# Patient Record
Sex: FEMALE | Race: WHITE | NOT HISPANIC OR LATINO | Employment: UNEMPLOYED | URBAN - METROPOLITAN AREA
[De-identification: names, ages, dates, MRNs, and addresses within clinical notes are randomized per-mention and may not be internally consistent; named-entity substitution may affect disease eponyms.]

---

## 2022-04-28 DIAGNOSIS — R56.9 SEIZURES: Primary | ICD-10-CM

## 2022-08-23 PROBLEM — G40.909 SEIZURE DISORDER: Status: ACTIVE | Noted: 2017-06-05

## 2022-08-23 PROBLEM — E03.9 HYPOTHYROIDISM: Status: ACTIVE | Noted: 2017-08-14

## 2022-08-23 PROBLEM — H91.90 HEARING LOSS: Status: ACTIVE | Noted: 2017-06-05

## 2022-08-23 NOTE — PROGRESS NOTES
[facility] Initial Office Visit Note    Initial Visit Date: 08/24/2022  Current Visit Date:  08/24/2022    Chief Complaint:     Chief Complaint   Patient presents with    Seizures     States been 3-4 years ago       History of Present Illness:      This is 37 y.o. right hand dominant female who is referred for seizure disorder.    Age of Onset : 20 years old     Semiology: GTC for 3 minutes with post ictal confusion for 30 minutes     Frequency: Last event 2019    Provocation Factors: stress     Risk Factors  - Family history of seizure disorders:  No  - History of focal CNS lesions: No  - History of CNS infections: No  - History head trauma with prolonged LOC: No  - History of childhood seizures, including febrile seizures: No  - History of development delay: No   - History of underlying mood disorder: No   - History of sleep disorder: No  - Recreational drug use: Yes marijuana  - Alcohol use: No  - Family planning and contraceptive use: Tubal Ligation     Medications:     Current AEDs:   mg daily - gum hyperplasia.     Prior AEDs:  Cannot recall     Surgical Intervention & Devices:     - VNS:  - DBS  - RNS:  - Lobectomy:    Labs:     No results found for this or any previous visit.    Studies:      - routine EEG:    - one hour EEG:   - 24 hour EEG:  - Ambulatory EEG:  - EMU Video EEG:  - MRI Brain:   - NCHCT:  - WADA:    Review of Systems:     Review of Systems   All other systems reviewed and are negative.      Physical Exams:     Vitals:    08/24/22 1119   BP: 123/84   Pulse: 72   Resp: 20   Temp: 98.4 °F (36.9 °C)       Physical Exam  Vitals and nursing note reviewed.   Constitutional:       Appearance: Normal appearance.   HENT:      Head: Normocephalic and atraumatic.      Nose: Nose normal.      Mouth/Throat:      Mouth: Mucous membranes are moist.      Pharynx: Oropharynx is clear.   Eyes:      Conjunctiva/sclera: Conjunctivae normal.   Cardiovascular:      Rate and Rhythm: Normal rate and  regular rhythm.      Pulses: Normal pulses.   Pulmonary:      Effort: Pulmonary effort is normal.      Breath sounds: Normal breath sounds.   Abdominal:      General: Abdomen is flat.   Musculoskeletal:         General: Normal range of motion.      Cervical back: Normal range of motion.   Skin:     General: Skin is warm.   Neurological:      Mental Status: She is alert.       Comprehensive Neurological Exam:  Mental Status: Alert Oriented to Self, Date, and Place. Comprehension wnl. No dysarthria.   CN II - XII: GALILEO, No APD, VA grossly intact to finger counting at 6 ft, VFFC, No ptosis OU, EOMI without nystagmus, LT/Temp symmetric in CN V1-3 distribution, Hearing grossly intact, Face Symmetric, Tongue and Uvula midline, Trapezius symmetric bilateral.   Motor: tone and bulk wnl throughout, no abnormal involuntary or voluntary movements, 5/5 to confrontation, Fine finger movements wnl b/l, No pronator drift.   Sensory: LT, Proprioception, Vibration, PP, Temp symmetric. No sensory simultagnosia.   Reflexes: 2+ throughout, plantar reflexes downward bilateral.   Cerebellar: FNF wnl b/l, RAHM wnl b/l,  Romberg: Negative  Gait: normal.     Assessment:     This is 37 y.o. right hand dominant female with history of idiopathic generalized tonic clonic epilepsy.  Seizure-free for around 4 years.  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    Problem List Items Addressed This Visit        Neuro    Generalized idiopathic epilepsy, not intractable, without status epilepticus - Primary (Chronic)    Relevant Orders    EEG      Other Visit Diagnoses     Seizures        Gum hyperplasia              Plan:     [] Continue with  mg daily for now.  Will determine best medical management based on Electroencephalogram findings.  [] routine EEG    RTC 3 months telemedicine     Seizure education provided: including family planning and teratogenicity of AEDs, risk of uncontrolled seizure disorder, SUDEP. No driving until seizure free  for six months ([place] state law). No swimming, climbing heights, or operate heavy machinery without supervision. Patient is advised to avoid Benadryl, Tramadol, Wellbutrin, flashing lights, alcohol, sleep deprivation, and certain anti-biotics that can lower seizure threshold.     I have explained the treatment plan, diagnosis, and prognosis to patient. All questions are answered to the best of my knowledge.     Face to face time 45 minutes, including documentation, chart review, counseling, education, review of test results, relevant medical records, and coordination of care.   I have explained the treatment plan, diagnosis, and prognosis to patient. All questions are answered to the best of my knowledge.         Pratibha Bernal MD   General Neurology  08/24/2022

## 2022-08-24 ENCOUNTER — OFFICE VISIT (OUTPATIENT)
Dept: NEUROLOGY | Facility: CLINIC | Age: 37
End: 2022-08-24
Payer: MEDICAID

## 2022-08-24 VITALS
BODY MASS INDEX: 44.11 KG/M2 | OXYGEN SATURATION: 97 % | DIASTOLIC BLOOD PRESSURE: 84 MMHG | HEIGHT: 65 IN | RESPIRATION RATE: 20 BRPM | TEMPERATURE: 98 F | WEIGHT: 264.75 LBS | SYSTOLIC BLOOD PRESSURE: 123 MMHG | HEART RATE: 72 BPM

## 2022-08-24 DIAGNOSIS — K06.1 GUM HYPERPLASIA: ICD-10-CM

## 2022-08-24 DIAGNOSIS — G40.309 GENERALIZED IDIOPATHIC EPILEPSY, NOT INTRACTABLE, WITHOUT STATUS EPILEPTICUS: Primary | ICD-10-CM

## 2022-08-24 DIAGNOSIS — R56.9 SEIZURES: ICD-10-CM

## 2022-08-24 PROBLEM — G40.909 SEIZURE DISORDER: Chronic | Status: ACTIVE | Noted: 2017-06-05

## 2022-08-24 PROCEDURE — 3079F DIAST BP 80-89 MM HG: CPT | Mod: CPTII,,, | Performed by: PSYCHIATRY & NEUROLOGY

## 2022-08-24 PROCEDURE — 3074F PR MOST RECENT SYSTOLIC BLOOD PRESSURE < 130 MM HG: ICD-10-PCS | Mod: CPTII,,, | Performed by: PSYCHIATRY & NEUROLOGY

## 2022-08-24 PROCEDURE — 3074F SYST BP LT 130 MM HG: CPT | Mod: CPTII,,, | Performed by: PSYCHIATRY & NEUROLOGY

## 2022-08-24 PROCEDURE — 3008F PR BODY MASS INDEX (BMI) DOCUMENTED: ICD-10-PCS | Mod: CPTII,,, | Performed by: PSYCHIATRY & NEUROLOGY

## 2022-08-24 PROCEDURE — 1159F PR MEDICATION LIST DOCUMENTED IN MEDICAL RECORD: ICD-10-PCS | Mod: CPTII,,, | Performed by: PSYCHIATRY & NEUROLOGY

## 2022-08-24 PROCEDURE — 99204 PR OFFICE/OUTPT VISIT, NEW, LEVL IV, 45-59 MIN: ICD-10-PCS | Mod: S$PBB,,, | Performed by: PSYCHIATRY & NEUROLOGY

## 2022-08-24 PROCEDURE — 99204 OFFICE O/P NEW MOD 45 MIN: CPT | Mod: S$PBB,,, | Performed by: PSYCHIATRY & NEUROLOGY

## 2022-08-24 PROCEDURE — 3079F PR MOST RECENT DIASTOLIC BLOOD PRESSURE 80-89 MM HG: ICD-10-PCS | Mod: CPTII,,, | Performed by: PSYCHIATRY & NEUROLOGY

## 2022-08-24 PROCEDURE — 99213 OFFICE O/P EST LOW 20 MIN: CPT | Mod: PBBFAC | Performed by: PSYCHIATRY & NEUROLOGY

## 2022-08-24 PROCEDURE — 3008F BODY MASS INDEX DOCD: CPT | Mod: CPTII,,, | Performed by: PSYCHIATRY & NEUROLOGY

## 2022-08-24 PROCEDURE — 1159F MED LIST DOCD IN RCRD: CPT | Mod: CPTII,,, | Performed by: PSYCHIATRY & NEUROLOGY

## 2022-08-24 RX ORDER — PHENYTOIN SODIUM 100 MG/1
100 CAPSULE, EXTENDED RELEASE ORAL 3 TIMES DAILY
COMMUNITY
End: 2022-11-28 | Stop reason: SDUPTHER

## 2022-08-24 RX ORDER — BUPRENORPHINE HYDROCHLORIDE AND NALOXONE HYDROCHLORIDE DIHYDRATE 2; .5 MG/1; MG/1
TABLET SUBLINGUAL DAILY
COMMUNITY
End: 2022-11-28 | Stop reason: SDUPTHER

## 2022-09-22 ENCOUNTER — PROCEDURE VISIT (OUTPATIENT)
Dept: NEUROLOGY | Facility: HOSPITAL | Age: 37
End: 2022-09-22
Attending: PSYCHIATRY & NEUROLOGY
Payer: MEDICAID

## 2022-09-22 DIAGNOSIS — G40.309 GENERALIZED IDIOPATHIC EPILEPSY, NOT INTRACTABLE, WITHOUT STATUS EPILEPTICUS: ICD-10-CM

## 2022-09-22 PROCEDURE — 95816 EEG AWAKE AND DROWSY: CPT

## 2022-09-22 PROCEDURE — 95819 EEG AWAKE AND ASLEEP: CPT

## 2022-09-22 PROCEDURE — 95819 EEG AWAKE AND ASLEEP: CPT | Mod: 26,,, | Performed by: PSYCHIATRY & NEUROLOGY

## 2022-09-22 PROCEDURE — 95819 PR EEG,W/AWAKE & ASLEEP RECORD: ICD-10-PCS | Mod: 26,,, | Performed by: PSYCHIATRY & NEUROLOGY

## 2022-09-22 NOTE — PROCEDURES
ROUTINE EEG    Date/Time: 9/22/2022 7:30 AM  Performed by: Pratibha Bernal MD  Authorized by: Pratibha Bernal MD     Indication: Generalized idiopathic epilepsy, not intractable, without status epilepticus    Clinical Information: This is 37 y.o. right hand dominant female with history of idiopathic generalized tonic clonic epilepsy.     Anticonvulsants: Dilantin 700 mg daily     Recording Condition: This is a Routine electroencephalogram performed in outpatient settings using [employer] software. Electroencephalogram leads were placed using a 10-20 placement system. The electroencephalogram is monitored in real time by a technologist and is of good technical quality for review.     Technique: Electroencephalogram leads were placed using a 10-20 placement system and electrode impedance was maintained below 10KOhms. Bryson and seizure detection computer program was used for digital EEG analysis throughout the monitoring period, to screen the EEG in real time and alcides the data file with pointers to electrographic seizure and interictal discharges. Hyperventilation was not performed and Photic stimulation was performed.     Description:   Background Symmetry- Symmetric  Frequency - Beta  Voltage - Normal   Continuity - Continuous  Anterior to Posterior Gradient - Present  Posterior Dominant Rhythm - 11 Hz   Reactivity - Reactive  State Changes - Present with normal sleep stage N2 transients  Breach Artifact - Absent    Cyclic Alternating Pattern of Encephalopathy (CAPE) - Absent  Sporadic Epileptiform Discharges - Absent  Rhythmic or Periodic Patterns (RPPs) - Absent    Electroclinical Seizures (ECSz): Absent  Electroclinical Status Epilepticus (ECSE): Absent  Electrographical Seizures (Esz): Absent    Briefly Potentially Ictal Rhythmic Discharges (BIRDs): Absent  Ictal-Interictal Continuum (IIC): Absent    Conclusion: This is a normal Routine awake and asleep EEG.     Impression: This is a normal Routine awake and  asleep EEG. The possibility that the patient may have a potentially epileptogenic focus cannot be entirely excluded. There is however no ongoing seizure activity, nor is there any evidence of status epilepticus in this study. Clinical correlation is advised.

## 2022-09-28 ENCOUNTER — PATIENT MESSAGE (OUTPATIENT)
Dept: NEUROLOGY | Facility: CLINIC | Age: 37
End: 2022-09-28
Payer: MEDICAID

## 2022-11-25 NOTE — PROGRESS NOTES
[facility] Follow Up Telemedicine Office Visit Note    Initial Visit Date: 8/24/2022  Last Visit Date: 8/24/2022  Current Visit Date:  11/28/2022    Chief Complaint:     Chief Complaint   Patient presents with    F/U seizures-states none since last seen; Had EEG         History of Present Illness:      This is a real-time audio/video visit that was performed with the originating site at patient's home and the distant site, [facility]. Verbal consent to participate in interactive audio & video visit was obtained.    I discussed with the patient regarding the nature of our telehealth visits, that:    - Our sessions are not being recorded and that personal health information is protected  - Provider would evaluate the patient and recommend diagnostics and treatments based on my assessment  - [facility] will provide follow up care in person if/when the patient needs it.       This is 37 y.o. right hand dominant female with history of idiopathic generalized tonic clonic epilepsy.  Seizure-free for around 4 years.  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  During last visit, routine Electroencephalogram was ordered. Patient currently only taking phenytoin 300 mg daily.     Age of Onset : 20 years old      Semiology: GTC for 3 minutes with post ictal confusion for 30 minutes      Frequency: Last event 2019     Provocation Factors: stress      Risk Factors  - Family history of seizure disorders:  No  - History of focal CNS lesions: No  - History of CNS infections: No  - History head trauma with prolonged LOC: No  - History of childhood seizures, including febrile seizures: No  - History of development delay: No   - History of underlying mood disorder: No   - History of sleep disorder: No  - Recreational drug use: Yes marijuana  - Alcohol use: No  - Family planning and contraceptive use: Tubal Ligation     Medications:     Current AEDs:  Phenytoin  300 mg daily     Prior AEDs:   mg daily - gum hyperplasia.     Surgical Intervention & Devices:     - VNS:  - DBS  - RNS:  - Lobectomy:    Labs:     No results found for this or any previous visit.    Studies:      - routine EEG 9/22/2022:  This is a normal routine awake and asleep EEG.   - one hour EEG:   - 24 hour EEG:  - Ambulatory EEG:  - EMU Video EEG:  - MRI Brain:   - NCHCT:  - WADA:    Review of Systems:     All other systems reviewed and are negative.    Physical Exams:     Physical Exam  Nursing note reviewed.   Constitutional:       Appearance: Normal appearance.   HENT:      Head: Normocephalic.      Mouth/Throat:      Comments: Gum hyperplasia   Pulmonary:      Effort: Pulmonary effort is normal.   Musculoskeletal:         General: Normal range of motion.      Cervical back: Normal range of motion.   Neurological:      Mental Status: She is alert.     Telemedicine Comprehensive Neurological Exam:  Mental Status: Alert Oriented to Self, Date, and Place. Comprehension wnl. No dysarthria.   CN II - XII: PE OU, VA grossly intact, No ptosis OU, EOMI without nystagmus, Hearing grossly intact, Face Symmetric.   Motor: no abnormal involuntary or voluntary movements, Fine finger movements wnl b/l, No pronator drift.   Sensory: Proprioception symmetric in bilateral UE  Reflexes: unable to assess.   Cerebellar: RAHM wnl b/l.  Romberg: absent.   Gait: normal.    Assessment:     This is 37 y.o. right hand dominant female with history of idiopathic generalized tonic clonic epilepsy.  Seizure-free for around 4 years.  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    Problem List Items Addressed This Visit          Neuro    Generalized idiopathic epilepsy, not intractable, without status epilepticus - Primary (Chronic)     Other Visit Diagnoses       Gum hyperplasia                Plan:     [] Continue with Phenytoin to 300 mg daily for 4 weeks then decrease to 100 mg twice a day thereafter   [] Start  Levetiracetam 500 mg twice a day for 2 weeks then 750 mg twice a day thereafter    RTC 3 months      Seizure education provided: including family planning and teratogenicity of AEDs, risk of uncontrolled seizure disorder, SUDEP. No driving until seizure free for six months ([place] state law). No swimming, climbing heights, or operate heavy machinery without supervision. Patient is advised to avoid Benadryl, Tramadol, Wellbutrin, flashing lights, alcohol, sleep deprivation, and certain anti-biotics that can lower seizure threshold.     I have explained the treatment plan, diagnosis, and prognosis to patient. All questions are answered to the best of my knowledge.     Face to face time 30 minutes, including documentation, chart review, counseling, education, review of test results, relevant medical records, and coordination of care.   I have explained the treatment plan, diagnosis, and prognosis to patient. All questions are answered to the best of my knowledge.         Pratibha Bernal MD   General Neurology  11/28/2022

## 2022-11-28 ENCOUNTER — OFFICE VISIT (OUTPATIENT)
Dept: NEUROLOGY | Facility: CLINIC | Age: 37
End: 2022-11-28
Payer: MEDICAID

## 2022-11-28 DIAGNOSIS — G40.309 GENERALIZED IDIOPATHIC EPILEPSY, NOT INTRACTABLE, WITHOUT STATUS EPILEPTICUS: Primary | Chronic | ICD-10-CM

## 2022-11-28 DIAGNOSIS — K06.1 GUM HYPERPLASIA: ICD-10-CM

## 2022-11-28 PROCEDURE — 99214 OFFICE O/P EST MOD 30 MIN: CPT | Mod: 95,,, | Performed by: PSYCHIATRY & NEUROLOGY

## 2022-11-28 PROCEDURE — 1159F MED LIST DOCD IN RCRD: CPT | Mod: CPTII,95,, | Performed by: PSYCHIATRY & NEUROLOGY

## 2022-11-28 PROCEDURE — 99214 PR OFFICE/OUTPT VISIT, EST, LEVL IV, 30-39 MIN: ICD-10-PCS | Mod: 95,,, | Performed by: PSYCHIATRY & NEUROLOGY

## 2022-11-28 PROCEDURE — 1159F PR MEDICATION LIST DOCUMENTED IN MEDICAL RECORD: ICD-10-PCS | Mod: CPTII,95,, | Performed by: PSYCHIATRY & NEUROLOGY

## 2022-11-28 RX ORDER — MIRTAZAPINE 45 MG/1
45 TABLET, FILM COATED ORAL NIGHTLY
COMMUNITY

## 2022-11-28 RX ORDER — PHENYTOIN SODIUM 100 MG/1
CAPSULE, EXTENDED RELEASE ORAL
Qty: 102 CAPSULE | Refills: 2 | Status: SHIPPED | OUTPATIENT
Start: 2022-11-28 | End: 2023-02-27 | Stop reason: SDUPTHER

## 2022-11-28 RX ORDER — BUPRENORPHINE HYDROCHLORIDE AND NALOXONE HYDROCHLORIDE DIHYDRATE 8; 2 MG/1; MG/1
1 TABLET SUBLINGUAL
COMMUNITY
Start: 2022-11-11

## 2022-11-28 RX ORDER — LEVETIRACETAM 500 MG/1
TABLET ORAL
Qty: 90 TABLET | Refills: 4 | Status: SHIPPED | OUTPATIENT
Start: 2022-11-28 | End: 2023-02-27

## 2022-11-28 RX ORDER — ETONOGESTREL AND ETHINYL ESTRADIOL VAGINAL RING .015; .12 MG/D; MG/D
RING VAGINAL
COMMUNITY
End: 2022-11-28 | Stop reason: ALTCHOICE

## 2023-02-24 NOTE — PROGRESS NOTES
[facility] Follow Up Telemedicine Office Visit Note    Initial Visit Date: 8/24/2022  Last Visit Date: 11/28/2022  Current Visit Date:  02/27/2023    Chief Complaint:     Chief Complaint   Patient presents with    Patient presents today with a hx of seizures, last seizure     Patient complains of having migraines that can last up to 7       History of Present Illness:      This is a real-time audio/video visit that was performed with the originating site at patient's home and the distant site, [facility]. Verbal consent to participate in interactive audio & video visit was obtained.    I discussed with the patient regarding the nature of our telehealth visits, that:    - Our sessions are not being recorded and that personal health information is protected  - Provider would evaluate the patient and recommend diagnostics and treatments based on my assessment  - [facility] will provide follow up care in person if/when the patient needs it.       This is 37 y.o. right hand dominant female with history of idiopathic generalized tonic clonic epilepsy.  Seizure-free for around 4 years.  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  During last visit, Keppra 750 mg twice a day was started with titration and Phenytoin was decreased to 100 mg twice a day with titration. Phenytoin was discontinued by patient. Now reports severe pounding headaches, retro-orbital, impeding day-to-day activity, lasting 4 days with nausea, with photo phobia, and phonophobia.  Has around 4 - 6 headache days per month.     Age of Onset : 20 years old      Semiology: GTC for 3 minutes with post ictal confusion for 30 minutes      Frequency: Last event 2019     Provocation Factors: stress      Risk Factors  - Family history of seizure disorders:  No  - History of focal CNS lesions: No  - History of CNS infections: No  - History head trauma with prolonged LOC: No  - History of  childhood seizures, including febrile seizures: No  - History of development delay: No   - History of underlying mood disorder:  Mood swings.  - History of sleep disorder:  Not sleeping well at night with Remeron.  - Recreational drug use: Yes marijuana  - Alcohol use: No  - Family planning and contraceptive use: Tubal Ligation     Medications:     Current AEDs:  Levetiracetam 750 mg twice a day (12/12/2022 - present)     Prior AEDs:   mg daily - gum hyperplasia.     Surgical Intervention & Devices:     - VNS:  - DBS  - RNS:  - Lobectomy:    Labs:     No results found for this or any previous visit.    Studies:      - routine EEG 9/22/2022:  This is a normal routine awake and asleep EEG.   - one hour EEG:   - 24 hour EEG:  - Ambulatory EEG:  - EMU Video EEG:  - MRI Brain:   - NCHCT:  - WADA:    Review of Systems:     All other systems reviewed and are negative.    Physical Exams:     Physical Exam  Nursing note reviewed.   Constitutional:       Appearance: Normal appearance.   HENT:      Head: Normocephalic.      Mouth/Throat:      Comments: Gum hyperplasia   Pulmonary:      Effort: Pulmonary effort is normal.   Musculoskeletal:         General: Normal range of motion.      Cervical back: Normal range of motion.   Neurological:      Mental Status: She is alert.     Telemedicine Comprehensive Neurological Exam:  Mental Status: Alert Oriented to Self, Date, and Place. Comprehension wnl. No dysarthria.   CN II - XII: PE OU, VA grossly intact, No ptosis OU, EOMI without nystagmus, Hearing grossly intact, Face Symmetric.   Motor: no abnormal involuntary or voluntary movements, Fine finger movements wnl b/l, No pronator drift.   Sensory: Proprioception symmetric in bilateral UE  Reflexes: unable to assess.   Cerebellar: RAHM wnl b/l.  Romberg: absent.   Gait: normal.    Assessment:     This is 37 y.o. right hand dominant female with history of idiopathic generalized tonic clonic epilepsy.  Seizure-free for around 4  years.  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  Now also complaining of episodic migraine without aura with status migrainosus.    Problem List Items Addressed This Visit          Neuro    Generalized idiopathic epilepsy, not intractable, without status epilepticus - Primary (Chronic)     Other Visit Diagnoses       Gum hyperplasia                Plan:     # idiopathic generalized tonic-clonic epilepsy  [] Continue with Levetiracetam 750 mg twice a day     # migraine without aura with status migrainosus  [] Start Effexor 37.5 mg once a day  [] Start Rizatriptan 10 mg twice a day as needed     RTC 3 months  with NP    Seizure education provided: including family planning and teratogenicity of AEDs, risk of uncontrolled seizure disorder, SUDEP. No driving until seizure free for six months ([place] state law). No swimming, climbing heights, or operate heavy machinery without supervision. Patient is advised to avoid Benadryl, Tramadol, Wellbutrin, flashing lights, alcohol, sleep deprivation, and certain anti-biotics that can lower seizure threshold.     I have explained the treatment plan, diagnosis, and prognosis to patient. All questions are answered to the best of my knowledge.     Face to face time 40 minutes, including documentation, chart review, counseling, education, review of test results, relevant medical records, and coordination of care.   I have explained the treatment plan, diagnosis, and prognosis to patient. All questions are answered to the best of my knowledge.         Pratibha Bernal MD   General Neurology  02/27/2023

## 2023-02-27 ENCOUNTER — OFFICE VISIT (OUTPATIENT)
Dept: NEUROLOGY | Facility: CLINIC | Age: 38
End: 2023-02-27
Payer: MEDICAID

## 2023-02-27 DIAGNOSIS — G40.309 GENERALIZED IDIOPATHIC EPILEPSY, NOT INTRACTABLE, WITHOUT STATUS EPILEPTICUS: Primary | ICD-10-CM

## 2023-02-27 DIAGNOSIS — G43.001 MIGRAINE WITHOUT AURA AND WITH STATUS MIGRAINOSUS, NOT INTRACTABLE: ICD-10-CM

## 2023-02-27 DIAGNOSIS — K06.1 GUM HYPERPLASIA: ICD-10-CM

## 2023-02-27 PROCEDURE — 1159F MED LIST DOCD IN RCRD: CPT | Mod: CPTII,95,, | Performed by: PSYCHIATRY & NEUROLOGY

## 2023-02-27 PROCEDURE — 99215 OFFICE O/P EST HI 40 MIN: CPT | Mod: 95,,, | Performed by: PSYCHIATRY & NEUROLOGY

## 2023-02-27 PROCEDURE — 99215 PR OFFICE/OUTPT VISIT, EST, LEVL V, 40-54 MIN: ICD-10-PCS | Mod: 95,,, | Performed by: PSYCHIATRY & NEUROLOGY

## 2023-02-27 PROCEDURE — 1159F PR MEDICATION LIST DOCUMENTED IN MEDICAL RECORD: ICD-10-PCS | Mod: CPTII,95,, | Performed by: PSYCHIATRY & NEUROLOGY

## 2023-02-27 RX ORDER — RIZATRIPTAN BENZOATE 10 MG/1
10 TABLET ORAL 2 TIMES DAILY PRN
Qty: 9 TABLET | Refills: 4 | Status: SHIPPED | OUTPATIENT
Start: 2023-02-27 | End: 2023-05-29 | Stop reason: SDUPTHER

## 2023-02-27 RX ORDER — VENLAFAXINE 37.5 MG/1
37.5 TABLET ORAL DAILY
Qty: 30 TABLET | Refills: 4 | Status: SHIPPED | OUTPATIENT
Start: 2023-02-27 | End: 2023-05-29

## 2023-02-27 RX ORDER — LEVETIRACETAM 750 MG/1
750 TABLET ORAL 2 TIMES DAILY
Qty: 60 TABLET | Refills: 4 | Status: SHIPPED | OUTPATIENT
Start: 2023-02-27 | End: 2023-05-29 | Stop reason: SDUPTHER

## 2023-05-29 ENCOUNTER — OFFICE VISIT (OUTPATIENT)
Dept: NEUROLOGY | Facility: CLINIC | Age: 38
End: 2023-05-29
Payer: MEDICAID

## 2023-05-29 DIAGNOSIS — G40.309 GENERALIZED IDIOPATHIC EPILEPSY, NOT INTRACTABLE, WITHOUT STATUS EPILEPTICUS: Primary | Chronic | ICD-10-CM

## 2023-05-29 DIAGNOSIS — G43.001 MIGRAINE WITHOUT AURA AND WITH STATUS MIGRAINOSUS, NOT INTRACTABLE: ICD-10-CM

## 2023-05-29 DIAGNOSIS — R11.0 NAUSEA: ICD-10-CM

## 2023-05-29 PROCEDURE — 1159F MED LIST DOCD IN RCRD: CPT | Mod: CPTII,95,, | Performed by: NURSE PRACTITIONER

## 2023-05-29 PROCEDURE — 99214 PR OFFICE/OUTPT VISIT, EST, LEVL IV, 30-39 MIN: ICD-10-PCS | Mod: 95,,, | Performed by: NURSE PRACTITIONER

## 2023-05-29 PROCEDURE — 1159F PR MEDICATION LIST DOCUMENTED IN MEDICAL RECORD: ICD-10-PCS | Mod: CPTII,95,, | Performed by: NURSE PRACTITIONER

## 2023-05-29 PROCEDURE — 99214 OFFICE O/P EST MOD 30 MIN: CPT | Mod: 95,,, | Performed by: NURSE PRACTITIONER

## 2023-05-29 PROCEDURE — 1160F RVW MEDS BY RX/DR IN RCRD: CPT | Mod: CPTII,95,, | Performed by: NURSE PRACTITIONER

## 2023-05-29 PROCEDURE — 1160F PR REVIEW ALL MEDS BY PRESCRIBER/CLIN PHARMACIST DOCUMENTED: ICD-10-PCS | Mod: CPTII,95,, | Performed by: NURSE PRACTITIONER

## 2023-05-29 RX ORDER — LEVETIRACETAM 750 MG/1
750 TABLET ORAL 2 TIMES DAILY
Qty: 60 TABLET | Refills: 11 | Status: SHIPPED | OUTPATIENT
Start: 2023-05-29 | End: 2023-08-29 | Stop reason: SDUPTHER

## 2023-05-29 RX ORDER — RIZATRIPTAN BENZOATE 10 MG/1
10 TABLET ORAL 2 TIMES DAILY PRN
Qty: 9 TABLET | Refills: 4 | Status: SHIPPED | OUTPATIENT
Start: 2023-05-29 | End: 2023-08-29 | Stop reason: SDUPTHER

## 2023-05-29 RX ORDER — ONDANSETRON 4 MG/1
4 TABLET, ORALLY DISINTEGRATING ORAL 2 TIMES DAILY PRN
Qty: 20 TABLET | Refills: 2 | Status: SHIPPED | OUTPATIENT
Start: 2023-05-29 | End: 2023-08-29 | Stop reason: SDUPTHER

## 2023-05-29 NOTE — PROGRESS NOTES
[facility] Neurology Telemedicine Office Visit Note    Initial Visit Date: 8/24/2022  Last Visit Date: 2/27/2022  Current Visit Date:  05/29/2023    This is a real-time audio/video visit that was performed with the originating site at patient's home and the distant site, [facility]. Verbal consent to participate in interactive audio & video visit was obtained.    I discussed with the patient regarding the nature of our telehealth visits, that:    - Our sessions are not being recorded and that personal health information is protected  - Provider would evaluate the patient and recommend diagnostics and treatments based on my assessment  - [facility] will provide follow up care in person if/when the patient needs it.     Chief Complaint:     Chief Complaint   Patient presents with    Migraine     Pt reports minimal improvement.    Seizures     Pt denies any recent witnessed seizures.     History of Present Illness:    This is 37 y.o. right hand dominant female with history of idiopathic generalized tonic clonic epilepsy.  Seizure-free for around 4 years. 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  During last visit, Keppra 750 mg twice a day was continued, Effexor 37.5mg Qday and rizatriptan 10mg PO BID PRN at onset of migraine were initiated.     Today, Pt denies any witnessed seizure activity. Did not try venlafaxine as she was told by PCP that it would not help with sleep. States HA have improved in frequency to 1 HA that lasted 2 days per week. Last migraine two weeks ago, accompanied by nausea. Rizatriptan effective for abortive therapy.     Semiology of Migraine: severe pounding headaches, retro-orbital, impeding day-to-day activity, lasting 4 days with nausea, with photo phobia, and phonophobia.  Has around 4 - 6 headache days per month.    Age of Onset : 20 years old      Semiology: GTC for 3 minutes with post ictal confusion for 30 minutes       Frequency: Last event 2019     Provocation Factors: stress      Risk Factors  - Family history of seizure disorders:  No  - History of focal CNS lesions: No  - History of CNS infections: No  - History head trauma with prolonged LOC: No  - History of childhood seizures, including febrile seizures: No  - History of development delay: No   - History of underlying mood disorder:  Mood swings.  - History of sleep disorder:  Not sleeping well at night with Remeron.  - Recreational drug use: Yes marijuana  - Alcohol use: No  - Family planning and contraceptive use: Tubal Ligation     Medications:     Current AEDs:  Levetiracetam 750 mg twice a day (12/12/2022 - present)     Prior AEDs:   mg daily - gum hyperplasia.     Surgical Intervention & Devices:     - VNS:  - DBS  - RNS:  - Lobectomy:    Labs:     No results found for this or any previous visit.    Studies:      - routine EEG 9/22/2022:  This is a normal routine awake and asleep EEG.   - one hour EEG:   - 24 hour EEG:  - Ambulatory EEG:  - EMU Video EEG:  - MRI Brain:   - NCHCT:  - WADA:    Review of Systems:     As per HPI  All other systems reviewed and are negative.    Physical Exams:     Physical Exam  Nursing note reviewed.   Constitutional:       Appearance: Normal appearance.   HENT:      Head: Normocephalic.      Right Ear: External ear normal.      Left Ear: External ear normal.      Nose: Nose normal.      Mouth/Throat:      Mouth: Mucous membranes are moist.      Pharynx: Oropharynx is clear. No oropharyngeal exudate or posterior oropharyngeal erythema.   Eyes:      Extraocular Movements: Extraocular movements intact.      Conjunctiva/sclera: Conjunctivae normal.   Pulmonary:      Effort: Pulmonary effort is normal. No respiratory distress.   Abdominal:      Tenderness: There is no guarding.      Comments: Round   Musculoskeletal:         General: Normal range of motion.      Cervical back: Normal range of motion.   Skin:     Coloration: Skin is  not jaundiced or pale.      Findings: No erythema or rash.   Neurological:      General: No focal deficit present.      Mental Status: She is alert and oriented to person, place, and time. Mental status is at baseline.      Coordination: Coordination normal.      Gait: Gait normal.   Psychiatric:         Mood and Affect: Mood normal.         Behavior: Behavior normal.         Thought Content: Thought content normal.         Judgment: Judgment normal.     Telemedicine Comprehensive Neurological Exam:  Mental Status: Alert Oriented to Self, Date, and Place. Comprehension wnl. No dysarthria.   CN II - XII: PE OU, VA grossly intact, No ptosis OU, EOMI without nystagmus, Hearing grossly intact, Face Symmetric.   Motor: no abnormal involuntary or voluntary movements, Fine finger movements wnl b/l, No pronator drift.   Sensory: unable to assess due to nature of visit  Reflexes: unable to assess.   Cerebellar: RAHM wnl b/l.  Romberg: absent.   Gait: normal.    Assessment:     This is 37 y.o. right hand dominant female with history of idiopathic generalized tonic clonic epilepsy. Seizure-free for around 4 years. Hx of gum hyperplasia on Dilantin. Notes some improvement in frequency of migraine to one weekly that may last two days. Did not try Effexor, rizatriptan somewhat effective but was not aware she could take two does in 24 hrs. Also c/o nausea w/migraine. Does not wish to try Effexor at this time.     Problem List Items Addressed This Visit          Neuro    Generalized idiopathic epilepsy, not intractable, without status epilepticus - Primary (Chronic)    Relevant Medications    rizatriptan (MAXALT) 10 MG tablet    levETIRAcetam (KEPPRA) 750 MG Tab    Migraine without aura and with status migrainosus, not intractable    Relevant Medications    rizatriptan (MAXALT) 10 MG tablet       GI    Nausea     Plan:     # idiopathic generalized tonic-clonic epilepsy  [] Continue with Levetiracetam 750 mg twice a day     #  migraine without aura with status migrainosus  [] hold Effexor 37.5 mg once a day  [] c/w Rizatriptan 10 mg twice a day as needed   [] call office for migraine >24 hrs and failed abortive therapy; will call in HA cocktail  [] start ondansetron 4mg PO BID PRN nausea    RTC 3 months - TM okay    Seizure education provided: including family planning and teratogenicity of AEDs, risk of uncontrolled seizure disorder, SUDEP. No driving until seizure free for six months ([place] state law). No swimming, climbing heights, or operate heavy machinery without supervision. Patient is advised to avoid Benadryl, Tramadol, Wellbutrin, flashing lights, alcohol, sleep deprivation, and certain anti-biotics that can lower seizure threshold.     I have explained the treatment plan, diagnosis, and prognosis to patient. All questions are answered to the best of my knowledge.     Face to face time 30 minutes, including documentation, chart review, counseling, education, review of test results, relevant medical records, and coordination of care.     I have explained the treatment plan, diagnosis, and prognosis to patient. All questions are answered to the best of my knowledge.     05/29/2023

## 2023-08-29 ENCOUNTER — OFFICE VISIT (OUTPATIENT)
Dept: NEUROLOGY | Facility: CLINIC | Age: 38
End: 2023-08-29
Payer: MEDICAID

## 2023-08-29 DIAGNOSIS — G43.001 MIGRAINE WITHOUT AURA AND WITH STATUS MIGRAINOSUS, NOT INTRACTABLE: ICD-10-CM

## 2023-08-29 DIAGNOSIS — R11.0 NAUSEA: ICD-10-CM

## 2023-08-29 DIAGNOSIS — E66.01 CLASS 3 SEVERE OBESITY DUE TO EXCESS CALORIES WITHOUT SERIOUS COMORBIDITY WITH BODY MASS INDEX (BMI) OF 40.0 TO 44.9 IN ADULT: ICD-10-CM

## 2023-08-29 DIAGNOSIS — G40.309 GENERALIZED IDIOPATHIC EPILEPSY, NOT INTRACTABLE, WITHOUT STATUS EPILEPTICUS: Primary | Chronic | ICD-10-CM

## 2023-08-29 PROBLEM — E66.813 CLASS 3 SEVERE OBESITY DUE TO EXCESS CALORIES WITHOUT SERIOUS COMORBIDITY WITH BODY MASS INDEX (BMI) OF 40.0 TO 44.9 IN ADULT: Status: ACTIVE | Noted: 2023-08-29

## 2023-08-29 PROCEDURE — 1160F PR REVIEW ALL MEDS BY PRESCRIBER/CLIN PHARMACIST DOCUMENTED: ICD-10-PCS | Mod: CPTII,95,, | Performed by: NURSE PRACTITIONER

## 2023-08-29 PROCEDURE — 1159F PR MEDICATION LIST DOCUMENTED IN MEDICAL RECORD: ICD-10-PCS | Mod: CPTII,95,, | Performed by: NURSE PRACTITIONER

## 2023-08-29 PROCEDURE — 1159F MED LIST DOCD IN RCRD: CPT | Mod: CPTII,95,, | Performed by: NURSE PRACTITIONER

## 2023-08-29 PROCEDURE — 99443 PR PHYSICIAN TELEPHONE EVALUATION 21-30 MIN: CPT | Mod: 95,,, | Performed by: NURSE PRACTITIONER

## 2023-08-29 PROCEDURE — 1160F RVW MEDS BY RX/DR IN RCRD: CPT | Mod: CPTII,95,, | Performed by: NURSE PRACTITIONER

## 2023-08-29 PROCEDURE — 99443 PR PHYSICIAN TELEPHONE EVALUATION 21-30 MIN: ICD-10-PCS | Mod: 95,,, | Performed by: NURSE PRACTITIONER

## 2023-08-29 RX ORDER — LEVETIRACETAM 750 MG/1
750 TABLET ORAL 2 TIMES DAILY
Qty: 180 TABLET | Refills: 3 | Status: SHIPPED | OUTPATIENT
Start: 2023-08-29 | End: 2024-02-26 | Stop reason: SDUPTHER

## 2023-08-29 RX ORDER — RIZATRIPTAN BENZOATE 10 MG/1
10 TABLET ORAL 2 TIMES DAILY PRN
Qty: 12 TABLET | Refills: 2 | Status: SHIPPED | OUTPATIENT
Start: 2023-08-29 | End: 2024-02-26 | Stop reason: SDUPTHER

## 2023-08-29 RX ORDER — ONDANSETRON 4 MG/1
4 TABLET, ORALLY DISINTEGRATING ORAL 2 TIMES DAILY PRN
Qty: 30 TABLET | Refills: 2 | Status: SHIPPED | OUTPATIENT
Start: 2023-08-29 | End: 2024-02-26 | Stop reason: SDUPTHER

## 2023-08-29 NOTE — PROGRESS NOTES
[facility] Neurology Audio Only Follow Up Visit Note    Initial Visit Date: 8/24/2022  Last Visit Date:  5/29/2023  Current Visit Date:  08/29/2023    This is a real-time audio visit that was performed with the originating site at patient's home and the distant site, [facility]. Verbal consent to participate in interactive audio & video visit was obtained.    I discussed with the patient regarding the nature of our telehealth visits, that:    - Our sessions are not being recorded and that personal health information is protected  - Provider would evaluate the patient and recommend diagnostics and treatments based on my assessment  - [facility] will provide follow up care in person if/when the patient needs it.     Chief Complaint:     Chief Complaint   Patient presents with    Seizures     Pt denies any recent seizures.    Migraine     Pt reports that this month has not been as bad as previous months, but she still has had a few.     History of Present Illness:    This is 38 y.o. right hand dominant female with history of idiopathic generalized tonic clonic epilepsy.  Seizure-free for around 4 years. 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  During last visit, Keppra 750 mg twice a day was continued, Effexor 37.5mg was held and rizatriptan 10mg PO BID PRN was continued.     Today, Pt denies any witnessed seizure activity since prior to last visit.  Did not try venlafaxine as she was told by PCP that it would not help with sleep. States HA have improved in frequency to 1-4 migraine/month. Last migraine on 8/18/2023 when she went out of town, accompanied by nausea. Rizatriptan effective for abortive therapy, ondansetron effective for nausea. No formal exercise program at this time, but participates in yard work.    Semiology of Migraine: severe pounding headaches, retro-orbital, impeding day-to-day activity, lasting 4 days with nausea,  with photo phobia, and phonophobia.  Has around 4 - 6 headache days per month.    Age of Onset : 20 years old      Semiology: GTC for 3 minutes with post ictal confusion for 30 minutes      Frequency: Last event 2019     Provocation Factors: stress      Risk Factors  - Family history of seizure disorders:  No  - History of focal CNS lesions: No  - History of CNS infections: No  - History head trauma with prolonged LOC: No  - History of childhood seizures, including febrile seizures: No  - History of development delay: No   - History of underlying mood disorder:  Mood swings.  - History of sleep disorder:  Not sleeping well at night with Remeron.  - Recreational drug use: Yes marijuana  - Alcohol use: No  - Family planning and contraceptive use: Tubal Ligation     Medications:     Current AEDs:  Levetiracetam 750 mg twice a day (12/12/2022 - present)     Prior AEDs:   mg daily - gum hyperplasia.     Surgical Intervention & Devices:     - VNS:  - DBS  - RNS:  - Lobectomy:    Labs:     No results found for this or any previous visit.    Studies:      - routine EEG 9/22/2022:  This is a normal routine awake and asleep EEG.   - one hour EEG:   - 24 hour EEG:  - Ambulatory EEG:  - EMU Video EEG:  - MRI Brain:   - NCHCT:  - WADA:    Review of Systems:     As per HPI  All other systems reviewed and are negative.    Physical Exams:     Physical Exam  Nursing note reviewed.   Pulmonary:      Effort: Pulmonary effort is normal. No respiratory distress.   Neurological:      General: No focal deficit present.      Mental Status: She is alert and oriented to person, place, and time. Mental status is at baseline.   Psychiatric:         Mood and Affect: Mood normal.         Behavior: Behavior normal.         Thought Content: Thought content normal.         Judgment: Judgment normal.       Telemedicine Comprehensive Neurological Exam:  Mental Status: Alert Oriented to Self, Date, and Place. Comprehension wnl. No dysarthria.    CN I - XII: unable to assess due to nature of visit w/exception of CN VIII, hearing grossly intact   Motor: unable to assess due to nature of visit   Sensory: unable to assess due to nature of visit  Reflexes: unable to assess.   Cerebellar: unable to assess due to nature of visit  Romberg: unable to assess due to nature of visit  Gait: unable to assess due to nature of visit    Assessment:     This is 38 y.o. right hand dominant female with history of idiopathic generalized tonic clonic epilepsy. Seizure-free for around 4 years. Hx of gum hyperplasia on Dilantin. Notes some improvement in frequency of migraine to one weekly that may last two days. Rizatriptan effective as abortive therapy. Averages 1-4 migraine HA/month. Sleep pattern intermittent, may go to bed at 3AM and awaken at 9AM. No formal exercise program, not currently taking thyroid medication    Problem List Items Addressed This Visit          Neuro    Generalized idiopathic epilepsy, not intractable, without status epilepticus - Primary (Chronic)    Migraine without aura and with status migrainosus, not intractable       Endocrine    Class 3 severe obesity due to excess calories without serious comorbidity with body mass index (BMI) of 40.0 to 44.9 in adult     Plan:     # idiopathic generalized tonic-clonic epilepsy  [] Continue with Levetiracetam 750 mg twice a day     # migraine without aura with status migrainosus  [] c/w Rizatriptan 10 mg twice a day as needed   [] call office for migraine >24 hrs and failed abortive therapy; will call in HA cocktail  [] c/w ondansetron 4mg PO BID PRN nausea  [] call PCP to discuss restarting thyroid medication    RTC 3 months - TM okay    Seizure education provided: including family planning and teratogenicity of AEDs, risk of uncontrolled seizure disorder, SUDEP. No driving until seizure free for six months ([place] state law). No swimming, climbing heights, or operate heavy machinery without supervision. Patient  is advised to avoid Benadryl, Tramadol, Wellbutrin, flashing lights, alcohol, sleep deprivation, and certain anti-biotics that can lower seizure threshold.     I have explained the treatment plan, diagnosis, and prognosis to patient. All questions are answered to the best of my knowledge.     Face to face time 30 minutes, including documentation, chart review, counseling, education, review of test results, relevant medical records, and coordination of care.     I have explained the treatment plan, diagnosis, and prognosis to patient. All questions are answered to the best of my knowledge.     08/29/2023

## 2024-02-26 ENCOUNTER — OFFICE VISIT (OUTPATIENT)
Dept: NEUROLOGY | Facility: CLINIC | Age: 39
End: 2024-02-26
Payer: MEDICAID

## 2024-02-26 DIAGNOSIS — G43.001 MIGRAINE WITHOUT AURA AND WITH STATUS MIGRAINOSUS, NOT INTRACTABLE: ICD-10-CM

## 2024-02-26 DIAGNOSIS — G40.309 GENERALIZED IDIOPATHIC EPILEPSY, NOT INTRACTABLE, WITHOUT STATUS EPILEPTICUS: Primary | Chronic | ICD-10-CM

## 2024-02-26 DIAGNOSIS — E66.01 CLASS 3 SEVERE OBESITY DUE TO EXCESS CALORIES WITHOUT SERIOUS COMORBIDITY WITH BODY MASS INDEX (BMI) OF 40.0 TO 44.9 IN ADULT: ICD-10-CM

## 2024-02-26 DIAGNOSIS — R11.0 NAUSEA: ICD-10-CM

## 2024-02-26 PROCEDURE — 99443 PR PHYSICIAN TELEPHONE EVALUATION 21-30 MIN: CPT | Mod: 95,,, | Performed by: NURSE PRACTITIONER

## 2024-02-26 PROCEDURE — 1160F RVW MEDS BY RX/DR IN RCRD: CPT | Mod: CPTII,95,, | Performed by: NURSE PRACTITIONER

## 2024-02-26 PROCEDURE — 1159F MED LIST DOCD IN RCRD: CPT | Mod: CPTII,95,, | Performed by: NURSE PRACTITIONER

## 2024-02-26 RX ORDER — NALOXONE HYDROCHLORIDE 4 MG/.1ML
SPRAY NASAL
COMMUNITY
Start: 2024-02-09

## 2024-02-26 RX ORDER — LEVETIRACETAM 750 MG/1
750 TABLET ORAL 2 TIMES DAILY
Qty: 180 TABLET | Refills: 3 | Status: SHIPPED | OUTPATIENT
Start: 2024-02-26 | End: 2025-02-25

## 2024-02-26 RX ORDER — LOSARTAN POTASSIUM 50 MG/1
50 TABLET ORAL DAILY
COMMUNITY
Start: 2023-12-09

## 2024-02-26 RX ORDER — RIZATRIPTAN BENZOATE 10 MG/1
10 TABLET ORAL 2 TIMES DAILY PRN
Qty: 12 TABLET | Refills: 2 | Status: SHIPPED | OUTPATIENT
Start: 2024-02-26 | End: 2024-03-27

## 2024-02-26 RX ORDER — ONDANSETRON 4 MG/1
4 TABLET, ORALLY DISINTEGRATING ORAL 2 TIMES DAILY PRN
Qty: 30 TABLET | Refills: 2 | Status: SHIPPED | OUTPATIENT
Start: 2024-02-26

## 2024-02-26 RX ORDER — LEVOTHYROXINE SODIUM 50 UG/1
50 TABLET ORAL
COMMUNITY

## 2024-02-26 NOTE — PROGRESS NOTES
[facility] Neurology Audio Only Follow Up Visit Note    Initial Visit Date: 8/24/2022  Last Visit Date:  8/29/2023  Current Visit Date:  02/26/2024    This is a real-time audio visit that was performed with the originating site at patient's home and the distant site, [facility]. Verbal consent to participate in interactive audio only visit was obtained. Pt had difficulty with connecting to Epic.    I discussed with the patient regarding the nature of our telehealth visits, that:    - Our sessions are not being recorded and that personal health information is protected  - Provider would evaluate the patient and recommend diagnostics and treatments based on my assessment  - [facility] will provide follow up care in person if/when the patient needs it.     Chief Complaint:     Chief Complaint   Patient presents with    Seizures     Patient reports seizure approx. 2 weeks ago. Went to ED at [facility]. Reports she missed doses of LEV.     Migraine     Patient reports they have been better but still gets them on occasion.     History of Present Illness:    This is 38 y.o. right hand dominant female with history of idiopathic generalized tonic clonic epilepsy. Seizure-free for around 4 years. 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  During last visit, Keppra 750 mg twice a day was continued, rizatriptan 10mg PO BID PRN and ondansetron were continued.     Today, Pt admits to a seizure 2 weeks ago as she ran out of medication. Fell and hit her head and shoulder. Went to ED at [facility] where she was treated and released. No longer taking venlafaxine as Dc'd by PCP. Continues w/HA have improved in frequency to 1-4 migraine/month. Last migraine 2 weeks ago. Rizatriptan effective for abortive therapy, ondansetron effective for nausea. No formal exercise program at this time, but participates in yard work. Notes  increased stress during time of seizure. Has started a walking program and last 23 pounds in December when starting Ozempic. Unable to afford medication so no longer taking. Has made dietary adjustments as well.    Semiology of Migraine: severe pounding headaches, retro-orbital, impeding day-to-day activity, lasting 4 days with nausea, with photo phobia, and phonophobia.  Has around 4 - 6 headache days per month.    Age of Onset : 20 years old      Semiology: GTC for 3 minutes with post ictal confusion for 30 minutes      Frequency: Last event 2019     Provocation Factors: stress      Risk Factors  - Family history of seizure disorders:  No  - History of focal CNS lesions: No  - History of CNS infections: No  - History head trauma with prolonged LOC: No  - History of childhood seizures, including febrile seizures: No  - History of development delay: No   - History of underlying mood disorder:  Mood swings.  - History of sleep disorder:  Not sleeping well at night with Remeron.  - Recreational drug use: Yes marijuana  - Alcohol use: No  - Family planning and contraceptive use: Tubal Ligation     Medications:     Current AEDs:  Levetiracetam 750 mg twice a day (12/12/2022 - present)     Prior AEDs:   mg daily - gum hyperplasia.     Surgical Intervention & Devices:     - VNS:  - DBS  - RNS:  - Lobectomy:    Labs:     No results found for this or any previous visit.    Studies:      - routine EEG 9/22/2022:  This is a normal routine awake and asleep EEG.   - one hour EEG:   - 24 hour EEG:  - Ambulatory EEG:  - EMU Video EEG:  - MRI Brain:   - NCHCT:  - WADA:    Review of Systems:     As per HPI  All other systems reviewed and are negative.    Physical Exams:     Physical Exam  Nursing note reviewed.   Pulmonary:      Effort: Pulmonary effort is normal. No respiratory distress.   Neurological:      General: No focal deficit present.      Mental Status: She is alert and oriented to person, place, and time. Mental  status is at baseline.   Psychiatric:         Mood and Affect: Mood normal.         Behavior: Behavior normal.         Thought Content: Thought content normal.         Judgment: Judgment normal.       Telemedicine Comprehensive Neurological Exam:  Mental Status: Alert Oriented to Self, Date, and Place. Comprehension wnl. No dysarthria.   CN I - XII: unable to assess due to nature of visit w/exception of CN VIII, hearing grossly intact   Motor: unable to assess due to nature of visit   Sensory: unable to assess due to nature of visit  Reflexes: unable to assess.   Cerebellar: unable to assess due to nature of visit  Romberg: unable to assess due to nature of visit  Gait: unable to assess due to nature of visit    Assessment:     This is 38 y.o. right hand dominant female with history of idiopathic generalized tonic clonic epilepsy. Seizure-free for around 4 years. Hx of gum hyperplasia on Dilantin. Continues w/1-4 migraines/month. Rizatriptan effective as abortive therapy. Started walking program and has made dietary changes. Has lost 23 pounds on Ozempic, but can no longer afford medication.    Problem List Items Addressed This Visit          Neuro    Generalized idiopathic epilepsy, not intractable, without status epilepticus - Primary (Chronic)    Relevant Medications    levETIRAcetam (KEPPRA) 750 MG Tab    Migraine without aura and with status migrainosus, not intractable    Relevant Medications    rizatriptan (MAXALT) 10 MG tablet       Endocrine    Class 3 severe obesity due to excess calories without serious comorbidity with body mass index (BMI) of 40.0 to 44.9 in adult       GI    Nausea    Relevant Medications    ondansetron (ZOFRAN-ODT) 4 MG TbDL     Plan:     # idiopathic generalized tonic-clonic epilepsy  [] Continue with Levetiracetam 750 mg twice a day     # migraine without aura with status migrainosus  [] c/w Rizatriptan 10 mg twice a day as needed   [] call office for migraine >24 hrs and failed  abortive therapy; will call in HA cocktail  [] c/w ondansetron 4mg PO BID PRN nausea  [] c/w exercise 30 min daily x 5 days weekly for overall health and wellness    RTC 6 months - TM okay    Seizure education provided: including family planning and teratogenicity of AEDs, risk of uncontrolled seizure disorder, SUDEP. No driving until seizure free for six months ([place] state law). No swimming, climbing heights, or operate heavy machinery without supervision. Patient is advised to avoid Benadryl, Tramadol, Wellbutrin, flashing lights, alcohol, sleep deprivation, and certain anti-biotics that can lower seizure threshold.     I have explained the treatment plan, diagnosis, and prognosis to patient. All questions are answered to the best of my knowledge.     Face to face time 30 minutes, including documentation, chart review, counseling, education, review of test results, relevant medical records, and coordination of care.     I have explained the treatment plan, diagnosis, and prognosis to patient. All questions are answered to the best of my knowledge.     02/26/2024

## 2024-08-26 ENCOUNTER — OFFICE VISIT (OUTPATIENT)
Dept: NEUROLOGY | Facility: CLINIC | Age: 39
End: 2024-08-26
Payer: MEDICAID

## 2024-08-26 ENCOUNTER — PATIENT MESSAGE (OUTPATIENT)
Dept: NEUROLOGY | Facility: CLINIC | Age: 39
End: 2024-08-26

## 2024-08-26 DIAGNOSIS — R11.0 NAUSEA: ICD-10-CM

## 2024-08-26 DIAGNOSIS — E66.01 CLASS 3 SEVERE OBESITY DUE TO EXCESS CALORIES WITHOUT SERIOUS COMORBIDITY WITH BODY MASS INDEX (BMI) OF 40.0 TO 44.9 IN ADULT: ICD-10-CM

## 2024-08-26 DIAGNOSIS — G43.001 MIGRAINE WITHOUT AURA AND WITH STATUS MIGRAINOSUS, NOT INTRACTABLE: ICD-10-CM

## 2024-08-26 DIAGNOSIS — G40.309 GENERALIZED IDIOPATHIC EPILEPSY, NOT INTRACTABLE, WITHOUT STATUS EPILEPTICUS: Primary | Chronic | ICD-10-CM

## 2024-08-26 PROCEDURE — 99214 OFFICE O/P EST MOD 30 MIN: CPT | Mod: 95,,, | Performed by: NURSE PRACTITIONER

## 2024-08-26 PROCEDURE — 4010F ACE/ARB THERAPY RXD/TAKEN: CPT | Mod: CPTII,95,, | Performed by: NURSE PRACTITIONER

## 2024-08-26 PROCEDURE — 1159F MED LIST DOCD IN RCRD: CPT | Mod: CPTII,95,, | Performed by: NURSE PRACTITIONER

## 2024-08-26 RX ORDER — FLUOXETINE 10 MG/1
10 CAPSULE ORAL DAILY
COMMUNITY
Start: 2024-04-15

## 2024-08-26 RX ORDER — ONDANSETRON 4 MG/1
4 TABLET, ORALLY DISINTEGRATING ORAL 2 TIMES DAILY PRN
Qty: 30 TABLET | Refills: 2 | Status: SHIPPED | OUTPATIENT
Start: 2024-08-26 | End: 2024-08-26

## 2024-08-26 RX ORDER — RIZATRIPTAN BENZOATE 10 MG/1
10 TABLET ORAL 2 TIMES DAILY PRN
Qty: 12 TABLET | Refills: 2 | Status: SHIPPED | OUTPATIENT
Start: 2024-08-26 | End: 2024-09-25

## 2024-08-26 RX ORDER — ONDANSETRON 4 MG/1
4 TABLET, ORALLY DISINTEGRATING ORAL
COMMUNITY
Start: 2024-07-29

## 2024-08-26 RX ORDER — LEVETIRACETAM 750 MG/1
750 TABLET ORAL 2 TIMES DAILY
Qty: 60 TABLET | Refills: 6 | Status: SHIPPED | OUTPATIENT
Start: 2024-08-26 | End: 2024-08-26

## 2024-08-26 RX ORDER — LEVETIRACETAM 500 MG/1
750 TABLET ORAL 2 TIMES DAILY
COMMUNITY
Start: 2024-07-29

## 2024-08-26 RX ORDER — LOSARTAN POTASSIUM 25 MG/1
TABLET ORAL DAILY
COMMUNITY
Start: 2024-07-29

## 2024-08-26 NOTE — PROGRESS NOTES
"[facility] Telemedicine Follow Up Visit Note    Initial Visit Date: 8/24/2022  Last Visit Date:  2/26/2024  Current Visit Date:  08/26/2024    This is a real-time audio visit that was performed with the originating site at patient's home and the distant site, [facility]. Verbal consent to participate in interactive audio only visit was obtained. Pt had difficulty with connecting to Epic.    I discussed with the patient regarding the nature of our telehealth visits, that:    - Our sessions are not being recorded and that personal health information is protected  - Provider would evaluate the patient and recommend diagnostics and treatments based on my assessment  - [facility] will provide follow up care in person if/when the patient needs it.     Chief Complaint:     Chief Complaint   Patient presents with    Seizures     Last one was she thinks was in June upon waking.     History of Present Illness:    This is 39 y.o. right hand dominant female with history of idiopathic generalized tonic clonic epilepsy. Seizure-free for around 4 years. 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 During last visit, Keppra 750 mg twice a day was continued, rizatriptan 10mg PO BID PRN and ondansetron were continued.     Today, Pt states she's is not sure if she had a seizure or not. She woke up from sleep and felt "sore" all over her body. Her mouth and shoulders felt sore. Denies loss of bowel or bladder. Denies missing doses of medication. Notes increased stress during time of activity.    Continues w/improved migraines to 1-4 month. Last migraine July. Rizatriptan effective for abortive therapy, ondansetron effective for nausea. No longer on Ozempic so has regained weight. Considering weight loss surgery.    Semiology of Migraine: severe pounding headaches, retro-orbital, impeding day-to-day activity, lasting 4 days with nausea, with photo " phobia, and phonophobia. Has around 4 - 6 headache days per month.     Age of Onset : 20 years old      Semiology: GTC for 3 minutes with post ictal confusion for 30 minutes      Frequency: Last event 2019     Provocation Factors: stress      Risk Factors  - Family history of seizure disorders:  No  - History of focal CNS lesions: No  - History of CNS infections: No  - History head trauma with prolonged LOC: No  - History of childhood seizures, including febrile seizures: No  - History of development delay: No   - History of underlying mood disorder:  Mood swings.  - History of sleep disorder:  Not sleeping well at night with Remeron.  - Recreational drug use: Yes marijuana  - Alcohol use: No  - Family planning and contraceptive use: Tubal Ligation     Medications:     Current AEDs:  Levetiracetam 750 mg twice a day (12/12/2022 - present)     Prior AEDs:   mg daily - gum hyperplasia.     Surgical Intervention & Devices:     - VNS:  - DBS  - RNS:  - Lobectomy:    Labs:     No results found for this or any previous visit.    Studies:      - routine EEG 9/22/2022:  This is a normal routine awake and asleep EEG.   - one hour EEG:   - 24 hour EEG:  - Ambulatory EEG:  - EMU Video EEG:  - MRI Brain:   - NCHCT:  - WADA:    Review of Systems:     As per HPI  All other systems reviewed and are negative.    Physical Exams:     Physical Exam  Nursing note reviewed.   Constitutional:       Appearance: She is obese.   HENT:      Right Ear: External ear normal.      Left Ear: External ear normal.      Nose: Nose normal.      Mouth/Throat:      Mouth: Mucous membranes are moist.      Pharynx: Oropharynx is clear.   Eyes:      Conjunctiva/sclera: Conjunctivae normal.   Pulmonary:      Effort: Pulmonary effort is normal. No respiratory distress.   Abdominal:      General: There is no distension.      Tenderness: There is no guarding.   Musculoskeletal:         General: Normal range of motion.      Cervical back: Normal range  of motion.   Skin:     Coloration: Skin is not jaundiced.      Findings: No lesion or rash.   Neurological:      Mental Status: She is alert.     Telemedicine Comprehensive Neurological Exam:  Mental Status: Alert Oriented to Self, Date, and Place. Naming, repetition, and reading wnl. Comprehension wnl. No dysarthria.   CN II - XII: No ptosis OU, EOMI without nystagmus, Hearing grossly intact, Face Symmetric, Tongue and Uvula midline, Trapezius symmetric bilateral.   Motor: tone and bulk wnl throughout, no abnormal involuntary or voluntary movements, no satelliting w/orbiting, Fine finger movements wnl b/l, No pronator drift.   Sensory: TANYA due to nature of visit  Reflexes: TANYA due to nature of visit  Cerebellar: FNF wnl b/l  Romberg: Negative  Modified Tandem Romberg: wnl  Gait: normal, bilat arm swing intact    Assessment:     This is 39 y.o. right hand dominant female with history of idiopathic generalized tonic clonic epilepsy. Seizure-free for around 4 years. Hx of gum hyperplasia on Dilantin. Continues w/1-4 migraines/month. Rizatriptan effective as abortive therapy. Started walking program and has made dietary changes. Has regained weight off Ozempic. Is considering weight loss surgery.    Problem List Items Addressed This Visit          Neuro    Generalized idiopathic epilepsy, not intractable, without status epilepticus - Primary (Chronic)    Migraine without aura and with status migrainosus, not intractable    Relevant Medications    rizatriptan (MAXALT) 10 MG tablet       Endocrine    Class 3 severe obesity due to excess calories without serious comorbidity with body mass index (BMI) of 40.0 to 44.9 in adult       GI    Nausea     Plan:     # idiopathic generalized tonic-clonic epilepsy  [] Continue with Levetiracetam 750 mg twice a day     # migraine without aura with status migrainosus  [] c/w Rizatriptan 10 mg twice a day as needed   [] call office for migraine >24 hrs and failed abortive therapy; will  call in HA cocktail  [] c/w ondansetron 4mg PO BID PRN nausea  [] c/w exercise 30 min daily x 5 days weekly for overall health and wellness    RTC 6 months - TM okay    Seizure education provided: including family planning and teratogenicity of AEDs, risk of uncontrolled seizure disorder, SUDEP. No driving until seizure free for six months ([place] state law). No swimming, climbing heights, or operate heavy machinery without supervision. Patient is advised to avoid Benadryl, Tramadol, Wellbutrin, flashing lights, alcohol, sleep deprivation, and certain anti-biotics that can lower seizure threshold.     I have explained the treatment plan, diagnosis, and prognosis to patient. All questions are answered to the best of my knowledge.     Face to face time 30 minutes, including documentation, chart review, counseling, education, review of test results, relevant medical records, and coordination of care.     Paige Flowers, NP-C  General Neurology    08/26/2024

## 2025-02-17 ENCOUNTER — OFFICE VISIT (OUTPATIENT)
Dept: NEUROLOGY | Facility: CLINIC | Age: 40
End: 2025-02-17
Payer: MEDICAID

## 2025-02-17 DIAGNOSIS — G43.001 MIGRAINE WITHOUT AURA AND WITH STATUS MIGRAINOSUS, NOT INTRACTABLE: ICD-10-CM

## 2025-02-17 DIAGNOSIS — E66.812 CLASS 2 SEVERE OBESITY WITH SERIOUS COMORBIDITY AND BODY MASS INDEX (BMI) OF 39.0 TO 39.9 IN ADULT, UNSPECIFIED OBESITY TYPE: ICD-10-CM

## 2025-02-17 DIAGNOSIS — R11.0 NAUSEA: ICD-10-CM

## 2025-02-17 DIAGNOSIS — E66.01 CLASS 2 SEVERE OBESITY WITH SERIOUS COMORBIDITY AND BODY MASS INDEX (BMI) OF 39.0 TO 39.9 IN ADULT, UNSPECIFIED OBESITY TYPE: ICD-10-CM

## 2025-02-17 DIAGNOSIS — G40.309 GENERALIZED IDIOPATHIC EPILEPSY, NOT INTRACTABLE, WITHOUT STATUS EPILEPTICUS: Primary | Chronic | ICD-10-CM

## 2025-02-17 RX ORDER — ONDANSETRON 4 MG/1
4 TABLET, ORALLY DISINTEGRATING ORAL 3 TIMES DAILY PRN
Qty: 20 TABLET | Refills: 2 | Status: SHIPPED | OUTPATIENT
Start: 2025-02-17

## 2025-02-17 RX ORDER — RIZATRIPTAN BENZOATE 10 MG/1
10 TABLET ORAL 2 TIMES DAILY PRN
Qty: 12 TABLET | Refills: 2 | Status: SHIPPED | OUTPATIENT
Start: 2025-02-17 | End: 2025-03-19

## 2025-02-17 RX ORDER — FLUOXETINE 10 MG/1
1 CAPSULE ORAL DAILY
COMMUNITY
Start: 2024-03-01

## 2025-02-17 RX ORDER — LEVETIRACETAM 750 MG/1
750 TABLET ORAL 2 TIMES DAILY
Qty: 60 TABLET | Refills: 11 | Status: SHIPPED | OUTPATIENT
Start: 2025-02-17

## 2025-02-17 RX ORDER — BUPRENORPHINE AND NALOXONE 8; 2 MG/1; MG/1
FILM, SOLUBLE BUCCAL; SUBLINGUAL
COMMUNITY
Start: 2025-01-17

## 2025-02-17 RX ORDER — LEVETIRACETAM 750 MG/1
750 TABLET ORAL 2 TIMES DAILY
COMMUNITY
End: 2025-02-17 | Stop reason: SDUPTHER

## 2025-02-17 RX ORDER — ACETAMINOPHEN 500 MG
1000 TABLET ORAL EVERY 6 HOURS
COMMUNITY
Start: 2024-11-20

## 2025-02-17 RX ORDER — OMEPRAZOLE 20 MG/1
20 CAPSULE, DELAYED RELEASE ORAL
COMMUNITY

## 2025-02-17 NOTE — PROGRESS NOTES
[facility] Telemedicine Follow Up Visit Note    Initial Visit Date: 8/24/2022  Last Visit Date:  8/26/2024  Current Visit Date:  02/17/2025    This is a real-time audio visit that was performed with the originating site at patient's home and the distant site, [facility]. Verbal consent to participate in interactive audio only visit was obtained. Pt had difficulty with connecting to Epic.    I discussed with the patient regarding the nature of our telehealth visits, that:    - Our sessions are not being recorded and that personal health information is protected  - Provider would evaluate the patient and recommend diagnostics and treatments based on my assessment  - [facility] will provide follow up care in person if/when the patient needs it.     Chief Complaint:     Chief Complaint   Patient presents with    Seizures     Pt states no changes and no complaint     History of Present Illness:    This is 39 y.o.  R hand dominant female with history of idiopathic generalized tonic clonic epilepsy. Seizure-free for around 4 years. 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 During last visit, Keppra 750 mg twice a day, rizatriptan 10mg PO BID PRN and ondansetron 4mg ODT PRN were continued. Lap gastric sleeve 11/2024 in [facility]    Today, Pt states she has been doing well since last visit. Denies any seizure activity. Had migraine that lasted 3 days in the setting of stress, did not notify provider. Rizatriptan was ineffective at that time. Last migraine in January. Rizatriptan effective at that time. Not sleeping well at this time as she has been out of mirtazapine and fluoxetine. Lost 35 pounds. No formal exercise program at this time.    Semiology of Migraine: severe pounding headaches, retro-orbital, impeding day-to-day activity, lasting 4 days with nausea, with photo phobia, and phonophobia. Has around 4 - 6 headache days  per month.     Age of Onset : 20 years old      Semiology: GTC for 3 minutes with post ictal confusion for 30 minutes      Frequency: Last event 2019     Provocation Factors: stress      Risk Factors  - Family history of seizure disorders:  No  - History of focal CNS lesions: No  - History of CNS infections: No  - History head trauma with prolonged LOC: No  - History of childhood seizures, including febrile seizures: No  - History of development delay: No   - History of underlying mood disorder:  Mood swings.  - History of sleep disorder:  Not sleeping well at night with Remeron.  - Recreational drug use: Yes marijuana  - Alcohol use: No  - Family planning and contraceptive use: Tubal Ligation     Medications:     Current AEDs:  Levetiracetam 750 mg twice a day (12/12/2022 - present)     Prior AEDs:   mg daily - gum hyperplasia.     Current migraine preventative:  Fluoxetine 10mg PO Qday - has been out  Losartan 25mg PO Qday - effective    Current migraine abortive:  rizatriptan 10mg po BID PRN (2/26/2024 - present) effective  Ondansetron 4mg PO TID PRN (2/26/2024 - present) effective    Surgical Intervention & Devices:     - VNS:  - DBS  - RNS:  - Lobectomy:    Labs:     No results found for this or any previous visit.    Studies:      - routine EEG 9/22/2022:  This is a normal routine awake and asleep EEG.   - one hour EEG:   - 24 hour EEG:  - Ambulatory EEG:  - EMU Video EEG:  - MRI Brain:   - NCHCT:  - WADA:    Review of Systems:     As per HPI  All other systems reviewed and are negative.    Physical Exams:     Physical Exam  Nursing note reviewed.   Constitutional:       Appearance: She is obese.   HENT:      Right Ear: External ear normal.      Left Ear: External ear normal.      Nose: Nose normal.      Mouth/Throat:      Mouth: Mucous membranes are moist.      Pharynx: Oropharynx is clear.   Eyes:      Conjunctiva/sclera: Conjunctivae normal.   Pulmonary:      Effort: Pulmonary effort is normal. No  respiratory distress.   Abdominal:      General: There is no distension.      Tenderness: There is no guarding.   Musculoskeletal:         General: Normal range of motion.      Cervical back: Normal range of motion.   Skin:     Coloration: Skin is not jaundiced.      Findings: No lesion or rash.   Neurological:      Mental Status: She is alert.     Telemedicine Comprehensive Neurological Exam:  Mental Status: Alert Oriented to Self, Date, and Place. Naming, repetition, and reading wnl. Comprehension wnl. No dysarthria.   CN II - XII: No ptosis OU, EOMI without nystagmus, Hearing grossly intact, Face Symmetric, Tongue and Uvula midline, Trapezius symmetric bilateral.   Motor: tone and bulk wnl throughout, no abnormal involuntary or voluntary movements, no satelliting w/orbiting, Fine finger movements wnl b/l, No pronator drift.   Sensory: TANYA due to nature of visit  Reflexes: TANYA due to nature of visit  Cerebellar: FNF wnl b/l  Romberg: Negative  Gait: normal, bilat arm swing intact    Assessment:     This is 39 y.o.  R hand dominant female with history of idiopathic generalized tonic clonic epilepsy. Seizure-free for around 4 years. Hx of gum hyperplasia on Dilantin. Continues w/1-4 migraines/month. Rizatriptan effective as abortive therapy. Gastric sleeve surgery in [facility] in November. Has lost 35 pounds. No formal exercise program at this time. This is not a medication overuse HA.   Problem List Items Addressed This Visit          Neuro    Generalized idiopathic epilepsy, not intractable, without status epilepticus - Primary (Chronic)    Relevant Medications    levETIRAcetam (KEPPRA) 750 MG Tab    Migraine without aura and with status migrainosus, not intractable    Relevant Medications    rizatriptan (MAXALT) 10 MG tablet       Endocrine    Class 2 severe obesity with serious comorbidity and body mass index (BMI) of 39.0 to 39.9 in adult       GI    Nausea    Relevant Medications    ondansetron  (ZOFRAN-ODT) 4 MG TbDL       Plan:     # idiopathic generalized tonic-clonic epilepsy  [] c/w Levetiracetam 750 mg twice a day     # migraine without aura with status migrainosus  [] c/w Rizatriptan 10 mg twice a day as needed   [] call office for migraine >24 hrs and failed abortive therapy; will call in HA cocktail  [] c/w ondansetron 4mg PO BID PRN nausea  [] c/w exercise 30 min daily x 5 days weekly for overall health and wellness  [] call office for migraine 24hrs and failed abortive therapy; will call in HA cocktail    RTC 6 months - TM okay    Seizure education provided: including family planning and teratogenicity of AEDs, risk of uncontrolled seizure disorder, SUDEP. No driving until seizure free for six months ([place] state law). No swimming, climbing heights, or operate heavy machinery without supervision. Patient is advised to avoid Benadryl, Tramadol, Wellbutrin, flashing lights, alcohol, sleep deprivation, and certain anti-biotics that can lower seizure threshold.     I have explained the treatment plan, diagnosis, and prognosis to patient. All questions are answered to the best of my knowledge.     Face to face time 30 minutes, including documentation, chart review, counseling, education, review of test results, relevant medical records, and coordination of care.     Paige Flowers, NP-C  General Neurology    02/17/2025

## 2025-07-14 DIAGNOSIS — R11.0 NAUSEA: ICD-10-CM

## 2025-07-14 DIAGNOSIS — G43.001 MIGRAINE WITHOUT AURA AND WITH STATUS MIGRAINOSUS, NOT INTRACTABLE: ICD-10-CM

## 2025-07-14 RX ORDER — RIZATRIPTAN BENZOATE 10 MG/1
TABLET ORAL
Qty: 12 TABLET | Refills: 0 | Status: SHIPPED | OUTPATIENT
Start: 2025-07-14

## 2025-07-14 RX ORDER — ONDANSETRON 4 MG/1
TABLET, ORALLY DISINTEGRATING ORAL
Qty: 20 TABLET | Refills: 0 | Status: SHIPPED | OUTPATIENT
Start: 2025-07-14

## 2025-08-21 ENCOUNTER — TELEPHONE (OUTPATIENT)
Dept: NEUROLOGY | Facility: CLINIC | Age: 40
End: 2025-08-21
Payer: MEDICAID

## 2025-08-25 ENCOUNTER — OFFICE VISIT (OUTPATIENT)
Dept: NEUROLOGY | Facility: CLINIC | Age: 40
End: 2025-08-25
Payer: MEDICAID

## 2025-08-25 ENCOUNTER — PATIENT MESSAGE (OUTPATIENT)
Dept: NEUROLOGY | Facility: CLINIC | Age: 40
End: 2025-08-25

## 2025-08-25 DIAGNOSIS — E66.811 CLASS 1 OBESITY WITH SERIOUS COMORBIDITY AND BODY MASS INDEX (BMI) OF 34.0 TO 34.9 IN ADULT, UNSPECIFIED OBESITY TYPE: ICD-10-CM

## 2025-08-25 DIAGNOSIS — G40.309 GENERALIZED IDIOPATHIC EPILEPSY, NOT INTRACTABLE, WITHOUT STATUS EPILEPTICUS: Primary | Chronic | ICD-10-CM

## 2025-08-25 DIAGNOSIS — G43.001 MIGRAINE WITHOUT AURA AND WITH STATUS MIGRAINOSUS, NOT INTRACTABLE: Chronic | ICD-10-CM

## 2025-08-25 PROBLEM — E66.812 CLASS 2 SEVERE OBESITY WITH SERIOUS COMORBIDITY AND BODY MASS INDEX (BMI) OF 39.0 TO 39.9 IN ADULT: Chronic | Status: ACTIVE | Noted: 2023-08-29

## 2025-08-25 PROBLEM — E66.01 CLASS 2 SEVERE OBESITY WITH SERIOUS COMORBIDITY AND BODY MASS INDEX (BMI) OF 39.0 TO 39.9 IN ADULT: Chronic | Status: ACTIVE | Noted: 2023-08-29

## 2025-08-25 PROCEDURE — 1160F RVW MEDS BY RX/DR IN RCRD: CPT | Mod: CPTII,95,, | Performed by: NURSE PRACTITIONER

## 2025-08-25 PROCEDURE — 1159F MED LIST DOCD IN RCRD: CPT | Mod: CPTII,95,, | Performed by: NURSE PRACTITIONER

## 2025-08-25 PROCEDURE — 98005 SYNCH AUDIO-VIDEO EST LOW 20: CPT | Mod: 95,,, | Performed by: NURSE PRACTITIONER

## 2025-08-25 PROCEDURE — 4010F ACE/ARB THERAPY RXD/TAKEN: CPT | Mod: CPTII,95,, | Performed by: NURSE PRACTITIONER

## 2025-08-25 RX ORDER — SUMATRIPTAN SUCCINATE 100 MG/1
100 TABLET ORAL 2 TIMES DAILY PRN
Qty: 12 TABLET | Refills: 2 | Status: SHIPPED | OUTPATIENT
Start: 2025-08-25 | End: 2025-09-24